# Patient Record
(demographics unavailable — no encounter records)

---

## 2021-05-18 NOTE — ED GENERAL
General


Chief Complaint:  Neurological Problems


Stated Complaint:  [facility]


Nursing Triage Note:  


PATIENT ARRIVES TONIGHT VIA POV FROM [facility] AFTER HAVING WHAT IS 


DESCRIBED AS A POTENTIAL SEIZURE. PATIENT WAS EATING AND VISITING WITH FRIENDS 


WHEN ALL OF THE SUDDEN SHE FELL FORWARD EITH HER HEAD ALMOST HITTING THE TABLE. 


PATIENT IS UNABLE TO RECALL WHETHER SHE LOST CONCIOUSNESS BUT A FRIEND STATES 


SHE WAS RESPONDING WHEN ASKED IF SHE WAS OKAY, ALSO STATES HER LIPS WERE WHITE. 


PATIENT STATES AFTER SITTING BACK UP SHE BECAME NAUSEOUS AND WEAK. BILATERAL 


SIDE RAILS UP, CALL LIGHT IN REACH, S.O. AT BEDSIDE.


Nursing Sepsis Screen:  No Definite Risk


Source of Information:  Patient


Exam Limitations:  No Limitations





History of Present Illness


Date Seen by Provider:  May 18, 2021


Time Seen by Provider:  20:45


Initial Comments


Patient is a 25-year-old female who presents to the emergency department today 

after a near syncopal event.  Patient was sitting down and eating and with 

friends when she felt very hot sweaty, lightheaded and palpitations.  Patient 

states that she felt her head jerking forward and thought that she was about to 

pass out.  Patient states that she felt very weak.  She became very pale 

according to bystanders.  She remained pale on presentation to the emergency 

room and nauseated.  Patient describes an episode about 2 weeks ago when she had

similar symptoms while driving.  She had to pull over and felt like she was 

about to pass out with similar nausea, lightheadedness and palpitations.  

Patient denies any past medical history she takes no over-the-counter 

medications or prescription medications.  She just completed her menstrual cycle

and states that she is not pregnant.  No drug use, alcohol use.  No recent 

illnesses.  No fevers, chills, cough congestion abdominal pain vomiting or 

diarrhea.  No urinary complaints or abnormal vaginal discharge.  At the time of 

my initial evaluation although pale the patient states that she is starting to 

feel better.





Of note while her nurse was starting her IV and getting labs drawn the patient 

was noted to become a little bit nauseated and lightheaded and cardiac monitor 

showed her heart rate tach up to the 160s.  This spontaneously resolved pretty 

quickly and the patient started feeling better again.





All other review of systems reviewed and negative except as stated above.


Timing/Duration:  1 Hour


Severity:  Severe


Modifying Factors:  improves with Eating, improves with Rest


Associated Systoms:  Diaphoresis, Malaise, Nausea/Vomiting, Weakness





Allergies and Home Medications


Patient Home Medication List


Home Medication List Reviewed:  Yes





Review of Systems


Review of Systems


Constitutional:  see HPI, diaphoresis


EENTM:  no symptoms reported


Respiratory:  no symptoms reported


Cardiovascular:  palpitations


Gastrointestinal:  nausea


Genitourinary:  no symptoms reported


Pregnant:  No


Musculoskeletal:  no symptoms reported


Skin:  other (Diaphoretic)


Psychiatric/Neurological:  No Symptoms Reported





All Other Systems Reviewed


Negative Unless Noted:  Yes





Past Medical-Social-Family Hx


Patient Social History


Alcohol Use:  Occasionally Uses


Recent Infectious Disease Expo:  No


Recent Hopitalizations:  No





Past Medical History


Surgeries:  No


Respiratory:  No


Cardiac:  No


Neurological:  No


Last Menstrual Period:  May 11, 2021


Genitourinary:  No


Gastrointestinal:  No


Musculoskeletal:  No


Endocrine:  No


HEENT:  No


Cancer:  No


Psychosocial:  No


Integumentary:  No


Blood Disorders:  No





Physical Exam


Vital Signs





Vital Signs - First Documented








 5/18/21





 20:50


 


Temp 35.7


 


Pulse 84


 


Resp 18


 


B/P (MAP) 118/106 (110)





Capillary Refill : Less Than 3 Seconds


Height, Weight, BMI


Height: '"


Weight: lbs. oz. kg; 22.00 BMI


Method:


General Appearance:  No Apparent Distress, WD/WN


Eyes:  Bilateral Eye Normal Inspection, Bilateral Eye PERRL, Bilateral Eye EOMI


HEENT:  PERRL/EOMI


Neck:  Normal Inspection, Non Tender, Supple


Respiratory:  Lungs Clear, Normal Breath Sounds, No Accessory Muscle Use, No 

Respiratory Distress


Cardiovascular:  Regular Rate, Rhythm


Gastrointestinal:  Non Tender, Soft


Extremity:  Normal Inspection, Normal Range of Motion, Non Tender, No Calf 

Tenderness


Neurologic/Psychiatric:  Alert, Oriented x3, No Motor/Sensory Deficits, Normal 

Mood/Affect, CNs II-XII Norm as Tested


Skin:  Diaphoresis, Pallor





Progress/Results/Core Measures


Suspected Sepsis


Recent Fever Within 48 Hours:  No


Infection Criteria Present:  None


New/Unexplained  Altered Menta:  No


Sepsis Screen:  No Definite Risk


SIRS


Temperature: 


Pulse: 84 


Respiratory Rate: 18


 


Laboratory Tests


5/18/21 21:00: White Blood Count 8.6


Blood Pressure 118 /106 


Mean: 110


 


Laboratory Tests


5/18/21 21:00: 


Creatinine 0.66, Platelet Count 318, Total Bilirubin 0.3








Results/Orders


Lab Results





Laboratory Tests








Test


 5/18/21


21:00 5/18/21


22:28 Range/Units


 


 


White Blood Count


 8.6 


 


 4.3-11.0


10^3/uL


 


Red Blood Count


 4.37 


 


 3.80-5.11


10^6/uL


 


Hemoglobin 12.7   11.5-16.0  g/dL


 


Hematocrit 39   35-52  %


 


Mean Corpuscular Volume 88   80-99  fL


 


Mean Corpuscular Hemoglobin 29   25-34  pg


 


Mean Corpuscular Hemoglobin


Concent 33 


 


 32-36  g/dL





 


Red Cell Distribution Width 11.8   10.0-14.5  %


 


Platelet Count


 318 


 


 130-400


10^3/uL


 


Mean Platelet Volume 11.0   9.0-12.2  fL


 


Immature Granulocyte % (Auto) 0    %


 


Neutrophils (%) (Auto) 54   42-75  %


 


Lymphocytes (%) (Auto) 34   12-44  %


 


Monocytes (%) (Auto) 9   0-12  %


 


Eosinophils (%) (Auto) 2   0-10  %


 


Basophils (%) (Auto) 0   0-10  %


 


Neutrophils # (Auto)


 4.6 


 


 1.8-7.8


10^3/uL


 


Lymphocytes # (Auto)


 2.9 


 


 1.0-4.0


10^3/uL


 


Monocytes # (Auto)


 0.7 


 


 0.0-1.0


10^3/uL


 


Eosinophils # (Auto)


 0.2 


 


 0.0-0.3


10^3/uL


 


Basophils # (Auto)


 0.0 


 


 0.0-0.1


10^3/uL


 


Immature Granulocyte # (Auto)


 0.0 


 


 0.0-0.1


10^3/uL


 


Sodium Level 135   135-145  MMOL/L


 


Potassium Level 3.5 L  3.6-5.0  MMOL/L


 


Chloride Level 104     MMOL/L


 


Carbon Dioxide Level 22   21-32  MMOL/L


 


Anion Gap 9   5-14  MMOL/L


 


Blood Urea Nitrogen 10   7-18  MG/DL


 


Creatinine


 0.66 


 


 0.60-1.30


MG/DL


 


Estimat Glomerular Filtration


Rate > 60 


 


  





 


BUN/Creatinine Ratio 15    


 


Glucose Level 116 H    MG/DL


 


Calcium Level 7.9 L  8.5-10.1  MG/DL


 


Corrected Calcium 7.9 L  8.5-10.1  MG/DL


 


Total Bilirubin 0.3   0.1-1.0  MG/DL


 


Aspartate Amino Transf


(AST/SGOT) 20 


 


 5-34  U/L





 


Alanine Aminotransferase


(ALT/SGPT) 17 


 


 0-55  U/L





 


Alkaline Phosphatase 66     U/L


 


Total Protein 6.5   6.4-8.2  GM/DL


 


Albumin 4.0   3.2-4.5  GM/DL


 


Urine Pregnancy Test  NEGATIVE  NEGATIVE  








My Orders





Orders - GUANAKITO GRAY MD


Ed Iv/Invasive Line Start (5/18/21 21:19)


Cbc With Automated Diff (5/18/21 21:19)


Comprehensive Metabolic Panel (5/18/21 21:19)


Hcg,Qualitative Urine (5/18/21 21:19)


Ekg Tracing (5/18/21 21:19)


Ct Head Wo (5/18/21 21:19)


Ondansetron Injection (Zofran Injectio (5/18/21 21:21)





Medications Given in ED





Current Medications








 Medications  Dose


 Ordered  Sig/Marshall


 Route  Start Time


 Stop Time Status Last Admin


Dose Admin


 


 Ondansetron HCl  4 mg  STK-MED ONCE


 .ROUTE  5/18/21 21:21


 5/18/21 21:29 DC 5/18/21 21:32


4 MG








Vital Signs/I&O











 5/18/21





 20:50


 


Temp 35.7


 


Pulse 84


 


Resp 18


 


B/P (MAP) 118/106 (110)





Capillary Refill : Less Than 3 Seconds








Blood Pressure Mean:                    110








Progress Note :  


   Time:  23:02


Progress Note


Long discussion with the patient regarding her work-up here in the emergency 

department.  No clinical or objective findings found to warrant further 

investigation or admission.  I suspect that the patient may be having runs of 

SVT which are inducing her symptoms.  She looks well on discharge, good color, 

not diaphoretic and no longer nauseated.  Laboratory studies have been reviewed 

and are unremarkable.  Head CT is normal.  Patient does not have a headache 

currently.  No palpitations currently.  Heart rate is in the 60s to 70s.  Blood 

pressure is good.  I have given the patient an outpatient order for Holter 

monitor.  We will follow her up with Dr. Kerr.  She is given good return 

precautions which include to come back to the emergency room for return or 

worsening of symptoms.  She is comfortable with this plan of care.  All 

questions have been sought and answered.  Patient is stable for discharge.





Departure


Impression





   Primary Impression:  


   Near syncope


Disposition:  01 HOME, SELF-CARE


Condition:  Stable





Departure-Patient Inst.


Decision time for Depature:  23:03


Referrals:  


DADA KERR MD FACP [facility]


Patient Instructions:  LOCAL PHYSICIAN LIST, Near Fainting





Add. Discharge Instructions:  


Drink plenty of fluids to stay well-hydrated.





Follow-up with a Holter monitor, you have been given an outpatient order form to

have this placed.


Please call Dr. Kerr's office for a follow-up appointment after having the 

Holter monitor placed.





Come back to the emergency room for any return of symptoms, worsening concerns 

or new emergent complaints.





All discharge instructions reviewed with patient and/or family. Voiced 

understanding.











GUANAKITO GRAY MD         May 18, 2021 23:04

## 2021-05-18 NOTE — DIAGNOSTIC IMAGING REPORT
PROCEDURE: CT head without contrast.



TECHNIQUE: Multiple contiguous axial images were obtained through

the brain without the use of intravenous contrast. Auto Exposure

Controls were utilized during the CT exam to meet ALARA standards

for radiation dose reduction. 



INDICATION: Near syncope



COMPARISON: There are no prior studies available for comparison.



There is no mass, shift of the midline or hemorrhage to suggest

an acute intracranial abnormality. The ventricles are not

abnormally dilated. The bone windows show no evidence for a

fracture or for a destructive lesion. The orbits are symmetrical

and within normal limits. The sinuses are generally clear.



IMPRESSION:



1. There is no evidence for an acute intracranial abnormality.

2. If clinical concern regarding an underlying abnormality

persists, an MRI would be recommended for further study.



Dictated by: 



  Dictated on workstation # [id number]